# Patient Record
Sex: FEMALE | Race: BLACK OR AFRICAN AMERICAN | NOT HISPANIC OR LATINO | Employment: STUDENT | URBAN - METROPOLITAN AREA
[De-identification: names, ages, dates, MRNs, and addresses within clinical notes are randomized per-mention and may not be internally consistent; named-entity substitution may affect disease eponyms.]

---

## 2018-04-11 ENCOUNTER — OFFICE VISIT (OUTPATIENT)
Dept: RHEUMATOLOGY | Facility: CLINIC | Age: 23
End: 2018-04-11
Payer: COMMERCIAL

## 2018-04-11 ENCOUNTER — HOSPITAL ENCOUNTER (OUTPATIENT)
Dept: RADIOLOGY | Facility: HOSPITAL | Age: 23
Discharge: HOME | End: 2018-04-11
Attending: INTERNAL MEDICINE
Payer: COMMERCIAL

## 2018-04-11 VITALS
HEART RATE: 87 BPM | BODY MASS INDEX: 32.76 KG/M2 | SYSTOLIC BLOOD PRESSURE: 124 MMHG | HEIGHT: 60 IN | DIASTOLIC BLOOD PRESSURE: 81 MMHG | WEIGHT: 166.88 LBS

## 2018-04-11 DIAGNOSIS — M25.50 POLYARTHRALGIA: Primary | ICD-10-CM

## 2018-04-11 DIAGNOSIS — M25.50 POLYARTHRALGIA: ICD-10-CM

## 2018-04-11 DIAGNOSIS — R53.83 FATIGUE, UNSPECIFIED TYPE: ICD-10-CM

## 2018-04-11 DIAGNOSIS — M79.10 MYALGIA: ICD-10-CM

## 2018-04-11 DIAGNOSIS — E66.9 OBESITY (BMI 30.0-34.9): ICD-10-CM

## 2018-04-11 DIAGNOSIS — M79.7 FIBROMYALGIA: ICD-10-CM

## 2018-04-11 PROCEDURE — 77077 JOINT SURVEY SINGLE VIEW: CPT | Mod: TC

## 2018-04-11 PROCEDURE — 71046 X-RAY EXAM CHEST 2 VIEWS: CPT | Mod: TC,FY

## 2018-04-11 PROCEDURE — 77077 JOINT SURVEY SINGLE VIEW: CPT | Mod: 26,,, | Performed by: RADIOLOGY

## 2018-04-11 PROCEDURE — 71046 X-RAY EXAM CHEST 2 VIEWS: CPT | Mod: 26,,, | Performed by: RADIOLOGY

## 2018-04-11 PROCEDURE — 99205 OFFICE O/P NEW HI 60 MIN: CPT | Mod: S$GLB,,, | Performed by: INTERNAL MEDICINE

## 2018-04-11 PROCEDURE — 99999 PR PBB SHADOW E&M-NEW PATIENT-LVL III: CPT | Mod: PBBFAC,,, | Performed by: INTERNAL MEDICINE

## 2018-04-11 RX ORDER — PREGABALIN 25 MG/1
25 CAPSULE ORAL 2 TIMES DAILY
Qty: 60 CAPSULE | Refills: 2 | Status: SHIPPED | OUTPATIENT
Start: 2018-04-11 | End: 2018-10-10

## 2018-04-11 ASSESSMENT — ROUTINE ASSESSMENT OF PATIENT INDEX DATA (RAPID3)
MDHAQ FUNCTION SCORE: .8
FATIGUE SCORE: 7
PATIENT GLOBAL ASSESSMENT SCORE: 8
WHEN YOU AWAKENED IN THE MORNING OVER THE LAST WEEK, PLEASE INDICATE THE AMOUNT OF TIME IT TAKES UNTIL YOU ARE AS LIMBER AS YOU WILL BE FOR THE DAY: 30 MIN
PAIN SCORE: 8
AM STIFFNESS SCORE: 1, YES
TOTAL RAPID3 SCORE: 6.22
PSYCHOLOGICAL DISTRESS SCORE: 6.6

## 2018-04-11 NOTE — LETTER
April 11, 2018      Greg Magana MD  [address] [address]  [address]  [facility] [address]           [place] - Rheumatology  [address]  [facility] [address]  Phone: [phone]  Fax: [phone]          Patient: Lavell Delarosa   MR Number: [id number]   YOB: 1995   Date of Visit: 4/11/2018       Dear Dr. Greg Magana:    Thank you for referring Lavell Delarosa to me for evaluation. Attached you will find relevant portions of my assessment and plan of care.    If you have questions, please do not hesitate to call me. I look forward to following Lavell Delarosa along with you.    Sincerely,    Naima Wright MD    Enclosure  CC:  No Recipients    If you would like to receive this communication electronically, please contact [email or web address] or [phone] to request more information on [employer] access.    For providers and/or their staff who would like to refer a patient to [facility], please contact us through our one-stop-shop provider referral line, [facility], at [phone].    If you feel you have received this communication in error or would no longer like to receive these types of communications, please e-mail [email or web address]

## 2018-04-11 NOTE — PROGRESS NOTES
Subjective:       Patient ID: Lavell Delarosa is a 22 y.o. female.    Chief Complaint: Polyarthralgia   HPI 21 yo female is seen in consultation for joint pain.Saw Dr Foy in Oct 2016 Patient complains of polyarthralgia and myalgia for the last 2 -3 years. Progressively getting worse. Pain is aching type, intermittent, worse in the morning, worse with activity, better with rest.  Denies any joint effusion.  Early morning stiffness lasts for 30minutes  +SICCA symptoms  Reports a rash on the face which resolved with prednisone.  No rash at this time  She denies fever, weight loss,  ulcers, Raynaud's phenomenon, alopecia, diarrhea or blood in the stools  Father barry SLE, Pat aunt has Sarcoidosis                                  Widespread pain index 19  Note the areas which the patient has had pain over the last week:                   Shoulder-girdle, left               Shoulder-girdle, right                         Upper arm left                       Upper arm right                         Lower arm left                       Lower arm right    Hip (buttock, trochanter) left  Hip (buttock, trochanter) right                           Upper leg, left                         Upper leg, right                           Lower leg, left                         Lower leg, right                                     Jaw, left                                   Jaw, right                                        Chest                                  Abdomen                               Upper back                              Lower back                                        Neck  Score will be from 0-19:                                         Symptom severity score 10  Fatigue   Waking Unrefreshed  Cognitive Symptoms   0 = no problem, 1=slight or mild problem 2= moderate; considerable problems often present and/or at a moderate level, 3 = severe, pervasive, continuous, life disturbing problem  For each of the 3 symptoms,  indicate the level of severity over the past week using the Scale.  The symptom severity score is the sum of the severity of the 3 symptoms (fatigue, waking unrefreshed, and cognitive symptoms) plus the number of the following symptoms occurring during the previous 6 months:   Headaches  Pain or cramps in the lower abdomen  Depression  The final score is between 0 and 12                                          Criteria  Patient has fibromyalgia if the following 3 conditions are met:  1.  Widespread pain index greater than or equal to 7 and symptom severity score greater than or equal to 5 or widespread pain index between 3- 6, and symptom severity score greater than or equal to 9.    2.  Symptoms have been present in a similar level for at least 3 months  3.  The patient does not have a disorder that would otherwise sufficiently explain the pain      Review of Systems   Constitutional: Positive for fatigue. Negative for fever.   HENT: Negative for ear discharge and ear pain.    Eyes: Negative for pain and redness.   Respiratory: Negative for cough and shortness of breath.    Cardiovascular: Negative for chest pain and palpitations.   Gastrointestinal: Negative for abdominal distention and abdominal pain.   Genitourinary: Negative for genital sores and hematuria.   Musculoskeletal: Positive for myalgias.   Neurological: Negative for tremors and seizures.   Psychiatric/Behavioral: Negative for agitation and hallucinations.         Objective:   /81 (BP Location: Left arm, Patient Position: Sitting)   Pulse 87   Ht 5' (1.524 m)   Wt 75.7 kg (166 lb 14.2 oz)   LMP 03/11/2018   BMI 32.59 kg/m²      Physical Exam   Nursing note and vitals reviewed.  Constitutional: She is oriented to person, place, and time and well-developed, well-nourished, and in no distress.   HENT:   Head: Normocephalic and atraumatic.   No nasal or oral ulcers    Eyes: Conjunctivae and EOM are normal. Pupils are equal, round, and reactive  to light.   Neck: Normal range of motion. Neck supple. No thyromegaly present.   Cardiovascular: Normal rate and regular rhythm.  Exam reveals no friction rub.    Pulmonary/Chest: Effort normal and breath sounds normal.   Abdominal: Soft. Bowel sounds are normal. She exhibits no mass.   Neurological: She is alert and oriented to person, place, and time. She exhibits normal muscle tone.   Skin: Skin is warm and dry. No rash noted.     Psychiatric: Memory and affect normal.   Musculoskeletal: She exhibits no edema.   ROM is within normal limits in all joints including shoulders, elbows, wrists, hands, hips, knees, ankles, feet and spine  Patient is non tender in all joints including shoulders, elbows, wrists, hands, hips, knees, ankles, feet and spine  No joint effusion  Strength is 5/5 in all ext, reflexes are 2+b/l               Reviewed outside records from 1/16/18  Creatinine 0.9 LFTs normal TSH 1.3 uric acid 4.1  HLA-B27 negative ESR 9  WBC 4.9 hemoglobin 12.1 platelets 331 neutrophils 2568 lymphocytes 1436  UA without any proteinuria  SSA less than 1 CCP less than 16 RF less than 14 CRP 4.8  Vitamin D 24  Assessment:   Polyarthralgia- Rule out inflammatory arthritis  Diffused myalgia/fatigue-Rule out myositis  Fibromyalgia WPI 19 SSS 10  Rash on the face -call if reoccurs   Obesity         Plan:   Check arthritis survey  Check CPK and aldolase  Check chest x-ray and ACE levels  -The patient was educated on fibromyalgia.  Handout provided  -Symptoms/presentations, non-pharmacologic and pharmacologic treatments and their efficacies were reviewed.   -Non-pharmacologic approaches were stressed.   -Regular/daily exercise was especially emphasized. Strategies for success were offered.   Start Lyrica 25 mg twice a day  Slowly escalate the dose as tolerated  Discussed side effects including maternal-fetal risks  Counseled to lose weight  Advised to call back if the rash reoccurs.  Will need biopsy   Return to clinic in  3 months    -Patient seen face to face for 60 minutes and greater than 50% spent in counseling regarding above diagnosis and chart review